# Patient Record
Sex: FEMALE | Race: OTHER | URBAN - METROPOLITAN AREA
[De-identification: names, ages, dates, MRNs, and addresses within clinical notes are randomized per-mention and may not be internally consistent; named-entity substitution may affect disease eponyms.]

---

## 2019-09-17 ENCOUNTER — OFFICE VISIT (OUTPATIENT)
Dept: FAMILY MEDICINE CLINIC | Facility: CLINIC | Age: 29
End: 2019-09-17
Payer: COMMERCIAL

## 2019-09-17 VITALS
HEART RATE: 82 BPM | TEMPERATURE: 99 F | BODY MASS INDEX: 24.54 KG/M2 | WEIGHT: 125 LBS | DIASTOLIC BLOOD PRESSURE: 60 MMHG | HEIGHT: 60 IN | RESPIRATION RATE: 16 BRPM | SYSTOLIC BLOOD PRESSURE: 102 MMHG | OXYGEN SATURATION: 98 %

## 2019-09-17 DIAGNOSIS — J02.9 PHARYNGITIS, UNSPECIFIED ETIOLOGY: Primary | ICD-10-CM

## 2019-09-17 LAB — CONTROL LINE PRESENT WITH A CLEAR BACKGROUND (YES/NO): YES YES/NO

## 2019-09-17 PROCEDURE — 87147 CULTURE TYPE IMMUNOLOGIC: CPT | Performed by: PHYSICIAN ASSISTANT

## 2019-09-17 PROCEDURE — 87880 STREP A ASSAY W/OPTIC: CPT | Performed by: PHYSICIAN ASSISTANT

## 2019-09-17 PROCEDURE — 99202 OFFICE O/P NEW SF 15 MIN: CPT | Performed by: PHYSICIAN ASSISTANT

## 2019-09-17 PROCEDURE — 87081 CULTURE SCREEN ONLY: CPT | Performed by: PHYSICIAN ASSISTANT

## 2019-09-17 RX ORDER — AMOXICILLIN 875 MG/1
875 TABLET, COATED ORAL 2 TIMES DAILY
Qty: 20 TABLET | Refills: 0 | Status: SHIPPED | OUTPATIENT
Start: 2019-09-17 | End: 2019-09-21

## 2019-09-17 RX ORDER — FEXOFENADINE HCL 180 MG/1
180 TABLET ORAL DAILY
COMMUNITY

## 2019-09-17 NOTE — PROGRESS NOTES
CHIEF COMPLAINT:   Patient presents with:  Sore Throat        HPI:   Darby Loyola is 30year old female presents to clinic with complaint of  sore throat and bilateral ear pain since yesterday.    She reports she think she has been having some allergy congestion. THROAT: Posterior pharynx erythematous and injected. small PND, no exudates. Tonsils 2/4.    NECK: supple and mobile  LUNGS: clear to auscultation without R/R/W.  CARDIO: RRR without murmur  GI: good BS's,no masses, hepatosplenomegaly, or tende

## 2019-09-17 NOTE — PATIENT INSTRUCTIONS
Self-Care for Sore Throats  Sore throats occur for many reasons, such as colds, allergies, and infections caused by viruses or bacteria. In any case, your throat becomes red and sore.  Your goal for self-care is to reduce your discomfort while giving your © 1155-2183 [address], [address] [place]. All rights reserved. This information is not intended as a substitute for professional medical care. Always follow your healthcare professional's instructions.               V · Avoid salty or spicy foods, which can be irritating to the throat. Follow-up care  Follow up with your healthcare provider or our staff if you are not improving over the next week.   When to seek medical advice  Call your healthcare provider right away i

## 2019-09-19 ENCOUNTER — TELEPHONE (OUTPATIENT)
Dept: FAMILY MEDICINE CLINIC | Facility: CLINIC | Age: 29
End: 2019-09-19

## 2019-09-19 DIAGNOSIS — J20.9 BRONCHITIS WITH BRONCHOSPASM: ICD-10-CM

## 2019-09-19 DIAGNOSIS — J02.0 STREP PHARYNGITIS: Primary | ICD-10-CM

## 2019-09-19 RX ORDER — AZITHROMYCIN 250 MG/1
TABLET, FILM COATED ORAL
Qty: 6 TABLET | Refills: 0 | Status: SHIPPED | OUTPATIENT
Start: 2019-09-19 | End: 2019-10-04 | Stop reason: ALTCHOICE

## 2019-09-19 RX ORDER — PREDNISONE 20 MG/1
TABLET ORAL
Qty: 10 TABLET | Refills: 0 | Status: SHIPPED | OUTPATIENT
Start: 2019-09-19 | End: 2019-10-04 | Stop reason: ALTCHOICE

## 2019-09-19 RX ORDER — ALBUTEROL SULFATE 90 UG/1
2 AEROSOL, METERED RESPIRATORY (INHALATION) EVERY 4 HOURS PRN
Qty: 1 INHALER | Refills: 0 | Status: SHIPPED | OUTPATIENT
Start: 2019-09-19 | End: 2019-11-07 | Stop reason: ALTCHOICE

## 2019-09-19 NOTE — TELEPHONE ENCOUNTER
Patient returned the call. Advised that her Throat Culture isolated a strain of Strep which does not usually cause sore throat and illness sx.   Pt states that she held the printed rx for Amoxicillin which was rx'd if Strep positive or if she developed ear

## 2019-09-21 ENCOUNTER — TELEPHONE (OUTPATIENT)
Dept: FAMILY MEDICINE CLINIC | Facility: CLINIC | Age: 29
End: 2019-09-21

## 2019-09-21 ENCOUNTER — OFFICE VISIT (OUTPATIENT)
Dept: FAMILY MEDICINE CLINIC | Facility: CLINIC | Age: 29
End: 2019-09-21
Payer: COMMERCIAL

## 2019-09-21 VITALS
HEIGHT: 58 IN | HEART RATE: 85 BPM | DIASTOLIC BLOOD PRESSURE: 68 MMHG | RESPIRATION RATE: 20 BRPM | OXYGEN SATURATION: 98 % | WEIGHT: 123 LBS | TEMPERATURE: 99 F | BODY MASS INDEX: 25.82 KG/M2 | SYSTOLIC BLOOD PRESSURE: 92 MMHG

## 2019-09-21 DIAGNOSIS — Z23 NEED FOR VACCINATION: ICD-10-CM

## 2019-09-21 DIAGNOSIS — Z30.42 ENCOUNTER FOR SURVEILLANCE OF INJECTABLE CONTRACEPTIVE: ICD-10-CM

## 2019-09-21 DIAGNOSIS — D17.1 LIPOMA OF SKIN AND SUBCUTANEOUS TISSUE OF TRUNK: ICD-10-CM

## 2019-09-21 DIAGNOSIS — J06.9 URI WITH COUGH AND CONGESTION: Primary | ICD-10-CM

## 2019-09-21 PROCEDURE — 90686 IIV4 VACC NO PRSV 0.5 ML IM: CPT | Performed by: FAMILY MEDICINE

## 2019-09-21 PROCEDURE — 90471 IMMUNIZATION ADMIN: CPT | Performed by: FAMILY MEDICINE

## 2019-09-21 PROCEDURE — 99203 OFFICE O/P NEW LOW 30 MIN: CPT | Performed by: FAMILY MEDICINE

## 2019-09-21 RX ORDER — MONTELUKAST SODIUM 10 MG/1
TABLET ORAL
Refills: 0 | COMMUNITY
Start: 2019-09-17 | End: 2020-05-12 | Stop reason: ALTCHOICE

## 2019-09-21 RX ORDER — LIDOCAINE HYDROCHLORIDE 20 MG/ML
SOLUTION OROPHARYNGEAL
Refills: 0 | COMMUNITY
Start: 2019-09-18 | End: 2019-09-21

## 2019-09-21 RX ORDER — MEDROXYPROGESTERONE ACETATE 150 MG/ML
150 INJECTION, SUSPENSION INTRAMUSCULAR ONCE
Status: DISCONTINUED | OUTPATIENT
Start: 2019-11-19 | End: 2020-02-03

## 2019-09-21 RX ORDER — IPRATROPIUM BROMIDE 42 UG/1
SPRAY, METERED NASAL
Refills: 0 | COMMUNITY
Start: 2019-09-17

## 2019-09-21 RX ORDER — FLUTICASONE PROPIONATE 50 MCG
SPRAY, SUSPENSION (ML) NASAL DAILY
COMMUNITY

## 2019-09-21 NOTE — TELEPHONE ENCOUNTER
Patient signed A medical records authorization form for [address] [place] [address] identified below to disclose patient health information to Victorina Pereira Dr:     [address] [place] [address]  Dorys Escobedo [facility] [address]  Jaquan Hall  Phone: [phone]   Fax: [phone]

## 2019-09-21 NOTE — PATIENT INSTRUCTIONS
Understanding a Lipoma     A lipoma is a lump under the skin that’s made of fat. It’s not cancer (benign). It feels soft like rubber when you press it, and in most cases it doesn’t hurt. Some people have more than one.  A lipoma grows slowly over time and A large lipoma inside the body can press on organs, nerves, or other tissues and cause problems. For example, it can cause problems with breathing or digestion.   Living with a lipoma  Your healthcare provider may look at the lipoma during regular checkups

## 2019-09-22 PROBLEM — J30.9 ALLERGIC RHINITIS: Status: ACTIVE | Noted: 2019-09-22

## 2019-09-22 NOTE — PROGRESS NOTES
CHIEF COMPLAINT: Patient presents with:  Establish Care: allergies  Lump: on ribcage; noticed 2 days ago        HPI:     Dayna Kirby is a 30year old female presents for allergies, cold and sinus symptoms. Lump on abdomen.     Miracle Randall is a 31 yo F here t Pressure Father    • Diabetes Paternal Grandmother    • No Known Problems Mother    • Diabetes Maternal Grandmother    • Depression Maternal Grandmother    • Diabetes Maternal Grandfather    • High Cholesterol Maternal Grandfather    • No Known Problems Si pain and diarrhea. Neurological: Positive for headaches. Pertinent positives and negatives noted in the the HPI.     PHYSICAL EXAM:   BP 92/68 (BP Location: Left arm, Patient Position: Sitting, Cuff Size: adult)   Pulse 85   Temp 98.5 °F (36.9 °C) to Gen Surg fro eval and removal.    - SURGERY - INTERNAL    3. Need for vaccination    - FLULAVAL INFLUENZA VACCINE QUAD PRESERVATIVE FREE 0.5 ML    4.  Encounter for surveillance of injectable contraceptive  Was previously receiving this at Due 11/19-12/3

## 2019-09-27 ENCOUNTER — MED REC SCAN ONLY (OUTPATIENT)
Dept: FAMILY MEDICINE CLINIC | Facility: CLINIC | Age: 29
End: 2019-09-27

## 2019-10-04 ENCOUNTER — OFFICE VISIT (OUTPATIENT)
Dept: SURGERY | Facility: CLINIC | Age: 29
End: 2019-10-04
Payer: COMMERCIAL

## 2019-10-04 VITALS — HEART RATE: 85 BPM | TEMPERATURE: 99 F | SYSTOLIC BLOOD PRESSURE: 119 MMHG | DIASTOLIC BLOOD PRESSURE: 82 MMHG

## 2019-10-04 DIAGNOSIS — D17.1 LIPOMA OF ABDOMINAL WALL: Primary | ICD-10-CM

## 2019-10-04 PROCEDURE — 99203 OFFICE O/P NEW LOW 30 MIN: CPT | Performed by: SURGERY

## 2019-10-04 NOTE — H&P
New Patient Visit Note       Active Problems      1.  Lipoma of abdominal wall        Chief Complaint   Patient presents with:  Lump: NP ref by Dr. Edmondson Other Rt side Pt noticed it about 1 month ago, getting bigger and haing more discomfort      Allergi Cardiovascular: Negative for chest pain and palpitations. Gastrointestinal: Negative for abdominal distention, abdominal pain, blood in stool, constipation, diarrhea, nausea and vomiting.    Genitourinary: Negative for difficulty urinating, dysuria and fr 20-year-old female who is here for evaluation of anterior abdominal wall subcutaneous mass. She states that this has been present for quite some time but has recently increased in size significantly.   On physical examination this appears to be consistent

## 2019-11-01 ENCOUNTER — LAB REQUISITION (OUTPATIENT)
Dept: LAB | Facility: HOSPITAL | Age: 29
End: 2019-11-01
Payer: COMMERCIAL

## 2019-11-01 DIAGNOSIS — E88.2 LIPOMATOSIS, NOT ELSEWHERE CLASSIFIED: ICD-10-CM

## 2019-11-01 PROCEDURE — 88304 TISSUE EXAM BY PATHOLOGIST: CPT | Performed by: SURGERY

## 2019-11-07 ENCOUNTER — OFFICE VISIT (OUTPATIENT)
Dept: SURGERY | Facility: CLINIC | Age: 29
End: 2019-11-07

## 2019-11-07 VITALS
WEIGHT: 123 LBS | BODY MASS INDEX: 24.15 KG/M2 | HEART RATE: 76 BPM | SYSTOLIC BLOOD PRESSURE: 102 MMHG | TEMPERATURE: 98 F | HEIGHT: 60 IN | DIASTOLIC BLOOD PRESSURE: 68 MMHG

## 2019-11-07 DIAGNOSIS — R22.2 ABDOMINAL WALL LUMP: Primary | ICD-10-CM

## 2019-11-07 PROCEDURE — 99024 POSTOP FOLLOW-UP VISIT: CPT | Performed by: PHYSICIAN ASSISTANT

## 2019-11-07 NOTE — PROGRESS NOTES
Post Operative Visit Note       Active Problems  1. Abdominal wall lump         Chief Complaint   Patient presents with:  Post-Op: Post op visit--- excision of right upper quadrant abd lipoma done 11/1/19 with Dr. Adam Casas. c/o incisional pain 6/10.  Denies an Smoker      Smokeless tobacco: Never Used    Substance and Sexual Activity      Alcohol use:  Yes        Alcohol/week: 0.0 standard drinks        Frequency: Monthly or less        Drinks per session: 1 or 2        Binge frequency: Never        Comment: Soci (Oral)   Ht 60\"   Wt 123 lb (55.8 kg)   BMI 24.02 kg/m²   Physical Exam   Constitutional: She is oriented to person, place, and time. She appears well-developed and well-nourished. No distress. HENT:   Head: Normocephalic and atraumatic.    Eyes: Conjunc plan.           No orders of the defined types were placed in this encounter. Imaging & Referrals   None    Follow Up  Return if symptoms worsen or fail to improve.     Kathy Vallejo

## 2020-02-03 ENCOUNTER — OFFICE VISIT (OUTPATIENT)
Dept: FAMILY MEDICINE CLINIC | Facility: CLINIC | Age: 30
End: 2020-02-03
Payer: COMMERCIAL

## 2020-02-03 VITALS
BODY MASS INDEX: 24.15 KG/M2 | WEIGHT: 123 LBS | TEMPERATURE: 99 F | SYSTOLIC BLOOD PRESSURE: 106 MMHG | OXYGEN SATURATION: 99 % | DIASTOLIC BLOOD PRESSURE: 68 MMHG | HEIGHT: 60 IN | RESPIRATION RATE: 18 BRPM | HEART RATE: 90 BPM

## 2020-02-03 DIAGNOSIS — Z98.890 STATUS POST EXCISION OF LIPOMA: Primary | ICD-10-CM

## 2020-02-03 DIAGNOSIS — Z86.018 STATUS POST EXCISION OF LIPOMA: Primary | ICD-10-CM

## 2020-02-03 DIAGNOSIS — Z30.42 SURVEILLANCE FOR DEPO-PROVERA CONTRACEPTION: ICD-10-CM

## 2020-02-03 LAB
CONTROL LINE PRESENT WITH A CLEAR BACKGROUND (YES/NO): YES YES/NO
KIT LOT #: NORMAL NUMERIC

## 2020-02-03 PROCEDURE — 99214 OFFICE O/P EST MOD 30 MIN: CPT | Performed by: FAMILY MEDICINE

## 2020-02-03 PROCEDURE — 81025 URINE PREGNANCY TEST: CPT | Performed by: FAMILY MEDICINE

## 2020-02-03 PROCEDURE — 96372 THER/PROPH/DIAG INJ SC/IM: CPT | Performed by: FAMILY MEDICINE

## 2020-02-03 RX ORDER — MEDROXYPROGESTERONE ACETATE 150 MG/ML
150 INJECTION, SUSPENSION INTRAMUSCULAR ONCE
Status: DISCONTINUED | OUTPATIENT
Start: 2020-05-03 | End: 2020-05-12

## 2020-02-03 RX ORDER — MEDROXYPROGESTERONE ACETATE 150 MG/ML
150 INJECTION, SUSPENSION INTRAMUSCULAR ONCE
Status: COMPLETED | OUTPATIENT
Start: 2020-02-03 | End: 2020-02-03

## 2020-02-03 RX ADMIN — MEDROXYPROGESTERONE ACETATE 150 MG: 150 INJECTION, SUSPENSION INTRAMUSCULAR at 08:59:00

## 2020-02-03 NOTE — PROGRESS NOTES
CHIEF COMPLAINT: No chief complaint on file. HPI:     Myra Weber is a 30year old female presents for Depo Provera and pos-op pain. Depo Provera: Pt had been on an IUD about 4 yrs ago and currently on Depo Provera for the last 3 years.  She m Ipratropium Bromide 0.06 % Nasal Solution INT 2 SPRAYS IEN TID  0   • Montelukast Sodium 10 MG Oral Tab TK 1 T PO  QPM  0   • Fluticasone Propionate 50 MCG/ACT Nasal Suspension by Each Nare route daily.      • Fexofenadine HCl 180 MG Oral Tab Take 180 mg by Lot # 02/03/2020 [id number]    • Kit Expiration Date 02/03/2020 06/30/2021       REVIEWED THIS VISIT  ASSESSMENT/PLAN:   30year old female with    1. Status post excision of lipoma  Reassured pt that there is no active infection and post-op pain may take u

## 2020-02-03 NOTE — PATIENT INSTRUCTIONS
As of October 6th 2014, the Drug Enforcement Agency [facility]) is reclassifying all hydrocodone combination medications from Schedule III to Schedule II. This includes medications such as Norco, Vicodin, Lortab, Zohydro, and Vicoprofen.      What this means for types of birth control. Some are more effective than others. New types are being tested all the time. Your healthcare provider can help you decide which type of birth control is best for you.  But no matter which type you choose, you and your partner must u his penis just before he ejaculates. · Fertility awareness method is when a woman keeps track of her fertile days. She only has intercourse at times when she is not likely to get pregnant.   Emergency contraception [facility])  Emergency contraception can help pr

## 2020-05-12 ENCOUNTER — TELEPHONE (OUTPATIENT)
Dept: FAMILY MEDICINE CLINIC | Facility: CLINIC | Age: 30
End: 2020-05-12

## 2020-05-12 ENCOUNTER — TELEMEDICINE (OUTPATIENT)
Dept: FAMILY MEDICINE CLINIC | Facility: CLINIC | Age: 30
End: 2020-05-12

## 2020-05-12 DIAGNOSIS — Z30.42 SURVEILLANCE FOR DEPO-PROVERA CONTRACEPTION: ICD-10-CM

## 2020-05-12 DIAGNOSIS — J30.2 SEASONAL ALLERGIES: Primary | ICD-10-CM

## 2020-05-12 PROCEDURE — 99213 OFFICE O/P EST LOW 20 MIN: CPT | Performed by: FAMILY MEDICINE

## 2020-05-12 RX ORDER — MEDROXYPROGESTERONE ACETATE 150 MG/ML
150 INJECTION, SUSPENSION INTRAMUSCULAR ONCE
Status: DISCONTINUED | OUTPATIENT
Start: 2020-05-15 | End: 2020-05-21

## 2020-05-12 RX ORDER — MEDROXYPROGESTERONE ACETATE 150 MG/ML
150 INJECTION, SUSPENSION INTRAMUSCULAR ONCE
Status: DISCONTINUED | OUTPATIENT
Start: 2020-05-12 | End: 2020-05-12

## 2020-05-12 NOTE — TELEPHONE ENCOUNTER
Pt had video visit with me today 5/12/20 re: restarting Depo Provera. Her last dose was 2/3/20 and she already missed her window of her next dose (shuld have been given 4/32-5/5).  I pended order and please call her to schedule a nurse visit to come in for

## 2020-05-12 NOTE — PATIENT INSTRUCTIONS
Controlling Allergens: In the [facility]  Even a clean home can be full of allergens. So take a moment to see what you can do to cut down on allergens in each room of your home. · Buy an air purifier with a HEPA filter.  Look online or in [employer] f · Control pests such as cockroaches and mice. Close up areas of the home where pests can enter. Don't leave open food out in the kitchen. Use bait or traps to kill pests. Get professional pest control help if the problem doesn't go away.   · Try to stay ace

## 2020-05-13 NOTE — PROGRESS NOTES
Video visit    This visit is conducted using Telemedicine with live, interactive video and audio. Naun Pickens verbally consents to a Video visit on 05/12/20.     Patient understands and accepts financial responsibility for any deductible, co-insura for Depo Provera contraception: Last dose was 2/3, missed her next dose by 1 week. Will have RN call her to schedule nurse visit to administer next dose. Return for staff will call you to schedule nurse visit appt for Depo Provera.       Celestina Romeo

## 2020-05-20 ENCOUNTER — TELEPHONE (OUTPATIENT)
Dept: FAMILY MEDICINE CLINIC | Facility: CLINIC | Age: 30
End: 2020-05-20

## 2020-05-20 DIAGNOSIS — Z30.42 SURVEILLANCE FOR DEPO-PROVERA CONTRACEPTION: Primary | ICD-10-CM

## 2020-05-20 NOTE — TELEPHONE ENCOUNTER
Pt called office stating she needed to make an appt for her BC shot, but pt just got pete from the [facility] on 05/18/2020. Pt wants to know when she can come in to get her shot.

## 2020-05-21 RX ORDER — MEDROXYPROGESTERONE ACETATE 150 MG/ML
150 INJECTION, SUSPENSION INTRAMUSCULAR ONCE
Status: COMPLETED | OUTPATIENT
Start: 2020-05-22 | End: 2020-05-22

## 2020-05-22 ENCOUNTER — NURSE ONLY (OUTPATIENT)
Dept: FAMILY MEDICINE CLINIC | Facility: CLINIC | Age: 30
End: 2020-05-22
Payer: COMMERCIAL

## 2020-05-22 PROCEDURE — 96372 THER/PROPH/DIAG INJ SC/IM: CPT | Performed by: FAMILY MEDICINE

## 2020-05-22 RX ADMIN — MEDROXYPROGESTERONE ACETATE 150 MG: 150 INJECTION, SUSPENSION INTRAMUSCULAR at 08:14:00

## 2020-08-12 ENCOUNTER — NURSE ONLY (OUTPATIENT)
Dept: FAMILY MEDICINE CLINIC | Facility: CLINIC | Age: 30
End: 2020-08-12
Payer: COMMERCIAL

## 2020-08-12 VITALS — BODY MASS INDEX: 24 KG/M2 | DIASTOLIC BLOOD PRESSURE: 66 MMHG | SYSTOLIC BLOOD PRESSURE: 98 MMHG | WEIGHT: 123.38 LBS

## 2020-08-12 DIAGNOSIS — Z30.42 SURVEILLANCE FOR DEPO-PROVERA CONTRACEPTION: Primary | ICD-10-CM

## 2020-08-12 DIAGNOSIS — Z30.42 ENCOUNTER FOR SURVEILLANCE OF INJECTABLE CONTRACEPTIVE: ICD-10-CM

## 2020-08-12 LAB — CONTROL LINE PRESENT WITH A CLEAR BACKGROUND (YES/NO): YES YES/NO

## 2020-08-12 PROCEDURE — 3074F SYST BP LT 130 MM HG: CPT | Performed by: FAMILY MEDICINE

## 2020-08-12 PROCEDURE — 3078F DIAST BP <80 MM HG: CPT | Performed by: FAMILY MEDICINE

## 2020-08-12 PROCEDURE — 81025 URINE PREGNANCY TEST: CPT | Performed by: FAMILY MEDICINE

## 2020-08-12 PROCEDURE — 96372 THER/PROPH/DIAG INJ SC/IM: CPT | Performed by: FAMILY MEDICINE

## 2020-08-12 RX ORDER — MEDROXYPROGESTERONE ACETATE 150 MG/ML
150 INJECTION, SUSPENSION INTRAMUSCULAR ONCE
Status: COMPLETED | OUTPATIENT
Start: 2020-08-12 | End: 2020-08-12

## 2020-08-12 RX ADMIN — MEDROXYPROGESTERONE ACETATE 150 MG: 150 INJECTION, SUSPENSION INTRAMUSCULAR at 14:18:00

## 2020-08-12 NOTE — PROGRESS NOTES
Patient seen today in office for Depo-provera shot. Patient consent for reviewed and signed by patient. Weight, BP and bleeding history reviewed. POC urine HCG collect prior to administration. Results negative.     Depo-provera injection administered

## 2021-09-02 ENCOUNTER — TELEMEDICINE (OUTPATIENT)
Dept: FAMILY MEDICINE CLINIC | Facility: CLINIC | Age: 31
End: 2021-09-02

## 2021-09-02 DIAGNOSIS — L73.1 INGROWN HAIR: ICD-10-CM

## 2021-09-02 DIAGNOSIS — L02.214 ABSCESS, GROIN: Primary | ICD-10-CM

## 2021-09-02 PROCEDURE — 99214 OFFICE O/P EST MOD 30 MIN: CPT | Performed by: FAMILY MEDICINE

## 2021-09-02 RX ORDER — CEPHALEXIN 500 MG/1
500 CAPSULE ORAL 3 TIMES DAILY
Qty: 21 CAPSULE | Refills: 0 | Status: SHIPPED | OUTPATIENT
Start: 2021-09-02 | End: 2021-09-09

## 2021-09-02 NOTE — PROGRESS NOTES
Video Visit    This visit is conducted using Telemedicine with live, interactive video and audio. Naun Pickens  verbally consents to a Video visit.     Patient understands and accepts financial responsibility for any deductible, co-insurance and/or co

## 2021-09-02 NOTE — PATIENT INSTRUCTIONS
Abscess (Antibiotic Treatment Only)  An abscess happens when bacteria get trapped under the skin and start to grow. Pus forms inside the abscess as the body responds to the bacteria.  An abscess can happen with an insect bite, ingrown hair, blocked oil gl Pus or fluid coming from the abscess  · Boil returns after getting better  [employer] last reviewed this educational content on 8/1/2019  © 7307-7655 [employer]. All rights reserved.  This information is not intended as a substitute for profess

## 2023-01-09 ENCOUNTER — OFFICE VISIT (OUTPATIENT)
Dept: FAMILY MEDICINE CLINIC | Facility: CLINIC | Age: 33
End: 2023-01-09
Payer: COMMERCIAL

## 2023-01-09 VITALS
SYSTOLIC BLOOD PRESSURE: 122 MMHG | RESPIRATION RATE: 18 BRPM | OXYGEN SATURATION: 98 % | HEART RATE: 80 BPM | WEIGHT: 120.9 LBS | HEIGHT: 60 IN | DIASTOLIC BLOOD PRESSURE: 86 MMHG | BODY MASS INDEX: 23.74 KG/M2 | TEMPERATURE: 98 F

## 2023-01-09 DIAGNOSIS — Z76.89 ENCOUNTER TO ESTABLISH CARE: Primary | ICD-10-CM

## 2023-01-09 DIAGNOSIS — Z23 NEED FOR VACCINATION: ICD-10-CM

## 2023-01-09 DIAGNOSIS — D17.1 LIPOMA OF TORSO: ICD-10-CM

## 2023-01-09 PROCEDURE — 90471 IMMUNIZATION ADMIN: CPT | Performed by: NURSE PRACTITIONER

## 2023-01-09 PROCEDURE — 90686 IIV4 VACC NO PRSV 0.5 ML IM: CPT | Performed by: NURSE PRACTITIONER

## 2023-01-09 PROCEDURE — 99203 OFFICE O/P NEW LOW 30 MIN: CPT | Performed by: NURSE PRACTITIONER

## 2023-01-09 NOTE — PROGRESS NOTES
Chief Complaint  Establish Care (Requesting flu shot)    Subjective          Adalgisa Ibrahim presents to [facility] to establish care. Has history of fatty lipoma that was removed in approx 2019 and she believes it is coming back since she can feel the bump under her scar. She is wishing to start a family soon and doesn't want this lipoma to bother her while pregnant. Other than this, she is healthy individual.               Cyst  This is a recurrent problem. The current episode started more than 1 year ago. The problem occurs constantly. The problem has been gradually worsening. Associated symptoms include myalgias. The symptoms are aggravated by bending. Treatments tried: surg. The treatment provided mild relief.     No outpatient medications prior to visit.     No facility-administered medications prior to visit.       Review of Systems   Musculoskeletal: Positive for myalgias.         Objective   Vital Signs:   Visit Vitals  /86   Pulse 80   Temp 98 °F (36.7 °C) (Infrared)   Resp 18   Ht 152.4 cm (60\")   Wt 54.8 kg (120 lb 14.4 oz)   SpO2 98%   BMI 23.61 kg/m²     Physical Exam  Vitals and nursing note reviewed.   Constitutional:       Appearance: She is well-developed.   HENT:      Head: Normocephalic and atraumatic.   Eyes:      General: Lids are normal.      Conjunctiva/sclera: Conjunctivae normal.   Neck:      Thyroid: No thyroid mass or thyromegaly.      Trachea: Trachea normal. No tracheal tenderness.   Cardiovascular:      Rate and Rhythm: Normal rate.      Pulses: Normal pulses.      Heart sounds: Normal heart sounds.   Pulmonary:      Effort: Pulmonary effort is normal. No respiratory distress.      Breath sounds: Normal breath sounds. No wheezing.   Abdominal:      General: There is no distension.      Palpations: Abdomen is soft. There is mass.       Musculoskeletal:         General: Normal range of motion.      Cervical back: Normal range  of motion. No edema.   Skin:     General: Skin is warm and dry.      Coloration: Skin is not pale.      Findings: No abrasion, erythema or lesion.   Neurological:      Mental Status: She is alert and oriented to person, place, and time.   Psychiatric:         Mood and Affect: Mood is not anxious. Affect is not inappropriate.         Speech: Speech normal.         Behavior: Behavior normal.         Thought Content: Thought content normal.         Judgment: Judgment normal. Judgment is not impulsive.        Result Review :                 Assessment and Plan    Diagnoses and all orders for this visit:    1. Encounter to establish care (Primary)  -     TSH; Future  -     Vitamin D,25-Hydroxy; Future  -     Comprehensive Metabolic Panel; Future  -     Hemoglobin A1c; Future  -     CBC & Differential; Future  -     Vitamin B12; Future  -     Lipid Panel; Future    2. Lipoma of torso  -     Cancel: Ambulatory Referral to General Surgery  -     TSH; Future  -     Vitamin D,25-Hydroxy; Future  -     Comprehensive Metabolic Panel; Future  -     Hemoglobin A1c; Future  -     CBC & Differential; Future  -     Vitamin B12; Future  -     Lipid Panel; Future  -     Ambulatory Referral to General Surgery    3. Need for vaccination  -     FluLaval/Fluzone >6 mos ([id number])        Previously had fatty lipoma removed in 2019    Referral to Gen surgeon placed for evaluation    Complete ordered lab work   We will call with results    Please call the office if you have any issues            Follow Up   Return if symptoms worsen or fail to improve, for Next scheduled follow up.  Patient was given instructions and counseling regarding her condition or for health maintenance advice. Please see specific information pulled into the AVS if appropriate.           This document has been electronically signed by MAGDALENA Azul on January 11, 2023 07:51 CST

## 2023-01-16 ENCOUNTER — OFFICE VISIT (OUTPATIENT)
Dept: SURGERY | Facility: CLINIC | Age: 33
End: 2023-01-16
Payer: COMMERCIAL

## 2023-01-16 VITALS
WEIGHT: 120.6 LBS | OXYGEN SATURATION: 99 % | BODY MASS INDEX: 23.68 KG/M2 | HEART RATE: 72 BPM | HEIGHT: 60 IN | DIASTOLIC BLOOD PRESSURE: 82 MMHG | SYSTOLIC BLOOD PRESSURE: 120 MMHG | TEMPERATURE: 98.4 F

## 2023-01-16 DIAGNOSIS — D17.1 LIPOMA OF TORSO: Primary | ICD-10-CM

## 2023-01-16 PROCEDURE — 99213 OFFICE O/P EST LOW 20 MIN: CPT | Performed by: NURSE PRACTITIONER

## 2023-01-16 NOTE — PROGRESS NOTES
Subjective   Adalgisa Ibrahim is a 32 y.o. female     Chief Complaint: possible lipoma of abdomen.     History of Present Illness   Ms. Ibrahim is a 32 year old patient who presents today for concerns of possible recurrent lipoma of abdomen. Reports she had a fatty tumor surgically removed from same area in 2019 at a hospital in [place].  Admits over the last several months she noticed the area enlarging and is becoming tender to touch and painful with certain movement. She denies any additional injury or trauma to the area.  She has not had any imaging performed of this mass.         Review of Systems   Constitutional: Negative.    HENT: Negative.    Respiratory: Negative.    Cardiovascular: Negative.    Gastrointestinal: Negative.    Endocrine: Negative.    Musculoskeletal: Negative.    Skin: Negative.    Allergic/Immunologic: Negative.    Neurological: Negative.    Hematological: Negative.    Psychiatric/Behavioral: Negative.      Past Medical History:   Diagnosis Date   • Lymphoma (HCC)      History reviewed. No pertinent surgical history.  Family History   Problem Relation Age of Onset   • No Known Problems Mother    • Diabetes Father    • Hypertension Father    • High cholesterol Father    • No Known Problems Sister    • No Known Problems Brother    • No Known Problems Maternal Grandmother    • No Known Problems Maternal Grandfather    • No Known Problems Paternal Grandmother    • No Known Problems Paternal Grandfather      Social History     Socioeconomic History   • Marital status: Single   Tobacco Use   • Smoking status: Never   • Smokeless tobacco: Never   Vaping Use   • Vaping Use: Never used   Substance and Sexual Activity   • Alcohol use: Never   • Drug use: Never   • Sexual activity: Defer     No Known Allergies  Vitals:    01/16/23 1120   BP: 120/82   Pulse: 72   Temp: 98.4 °F (36.9 °C)   SpO2: 99%       Home Medications:  Prior to Admission medications    Not on File       Objective   Physical  Exam  Vitals reviewed.   Constitutional:       General: She is not in acute distress.     Appearance: Normal appearance. She is not ill-appearing, toxic-appearing or diaphoretic.   Cardiovascular:      Rate and Rhythm: Normal rate.   Pulmonary:      Effort: Pulmonary effort is normal. No respiratory distress.   Abdominal:      General: A surgical scar is present. There is no distension.      Palpations: Abdomen is soft. There is mass.       Neurological:      Mental Status: She is alert.   Psychiatric:         Mood and Affect: Mood normal.         Behavior: Behavior normal.         Thought Content: Thought content normal.         Judgment: Judgment normal.         Assessment & Plan       The encounter diagnosis was Lipoma of torso.  1. Obtain imaging as ordered. Will notify her of results when available and arrange excision by surgeon as she will likely choose to have excision under sedation.                         This document has been electronically signed by MAGDALENA George on January 16, 2023 15:26 CST

## 2023-01-20 ENCOUNTER — HOSPITAL ENCOUNTER (OUTPATIENT)
Dept: ULTRASOUND IMAGING | Facility: HOSPITAL | Age: 33
Discharge: HOME | End: 2023-01-20
Admitting: NURSE PRACTITIONER
Payer: COMMERCIAL

## 2023-01-20 DIAGNOSIS — D17.1 LIPOMA OF TORSO: ICD-10-CM

## 2023-01-20 PROCEDURE — 76705 ECHO EXAM OF ABDOMEN: CPT

## 2023-02-06 ENCOUNTER — PROCEDURE VISIT (OUTPATIENT)
Dept: SURGERY | Facility: CLINIC | Age: 33
End: 2023-02-06
Payer: COMMERCIAL

## 2023-02-06 VITALS
WEIGHT: 122 LBS | BODY MASS INDEX: 23.95 KG/M2 | OXYGEN SATURATION: 99 % | HEIGHT: 60 IN | SYSTOLIC BLOOD PRESSURE: 110 MMHG | TEMPERATURE: 99.1 F | HEART RATE: 83 BPM | DIASTOLIC BLOOD PRESSURE: 80 MMHG

## 2023-02-06 DIAGNOSIS — D17.1 LIPOMA OF TORSO: Primary | ICD-10-CM

## 2023-02-06 PROCEDURE — 99213 OFFICE O/P EST LOW 20 MIN: CPT | Performed by: SURGERY

## 2023-02-06 NOTE — PROGRESS NOTES
Chief Complaint   Patient presents with   • Procedure     Ref: Kayley, lipoma removal right torso        32-year-old female who had a lipoma removed from her upper right abdomen approximately 4 years ago.  She states that over the last several months she has noticed this area swelling and occasionally uncomfortable in certain positions or with certain clothes.  She denies any skin changes or drainage from the site.      Past Medical History:   Diagnosis Date   • Lipoma of torso     right   • Lymphoma (HCC)        Past Surgical History:   Procedure Laterality Date   • LACERATION REPAIR Right     knee   • LIPOMA EXCISION Right     torso   • WISDOM TOOTH EXTRACTION      4         Current Outpatient Medications:   •  COLLAGEN PO, Take  by mouth., Disp: , Rfl:   •  ZINC OXIDE PO, Take  by mouth., Disp: , Rfl:     No Known Allergies    Immunization History   Administered Date(s) Administered   • COVID-19 (PFIZER) PURPLE CAP 05/24/2021, 06/24/2021   • Covid-19 (Pfizer) Gray Cap 03/21/2022   • FluLaval/Fluzone >6mos 01/09/2023        Family History   Problem Relation Age of Onset   • No Known Problems Mother    • Diabetes Father    • Hypertension Father    • High cholesterol Father    • No Known Problems Sister    • No Known Problems Brother    • No Known Problems Maternal Grandmother    • No Known Problems Maternal Grandfather    • No Known Problems Paternal Grandmother    • No Known Problems Paternal Grandfather    • No Known Problems Half-Brother    • Diabetes Half-Brother        Social History     Socioeconomic History   • Marital status: Single   Tobacco Use   • Smoking status: Never     Passive exposure: Never   • Smokeless tobacco: Never   Vaping Use   • Vaping Use: Never used   • Passive vaping exposure: Yes   Substance and Sexual Activity   • Alcohol use: Not Currently     Comment: none in a year   • Drug use: Never   • Sexual activity: Defer     Birth control/protection: Vasectomy       Review of Systems    Gastrointestinal: Positive for abdominal pain.   Musculoskeletal: Positive for back pain.   Skin:        Lipoma right torso   Allergic/Immunologic: Positive for environmental allergies.   Hematological: Bruises/bleeds easily.   All other systems reviewed and are negative.      Vitals:    02/06/23 1104   BP: 110/80   Pulse: 83   Temp: 99.1 °F (37.3 °C)   SpO2: 99%       Physical Exam  Constitutional:       Appearance: Normal appearance.   HENT:      Head: Normocephalic and atraumatic.   Cardiovascular:      Rate and Rhythm: Normal rate and regular rhythm.   Pulmonary:      Effort: Pulmonary effort is normal. No respiratory distress.   Abdominal:      General: There is no distension.      Palpations: Abdomen is soft.      Tenderness: There is no abdominal tenderness.      Comments: Non-discrete lesion approximately 4 x 6 cm in area underlying and slightly medial to her previous scar   Neurological:      General: No focal deficit present.      Mental Status: She is alert and oriented to person, place, and time.   Psychiatric:         Mood and Affect: Mood normal.         Behavior: Behavior normal.           ASSESSMENT    Diagnoses and all orders for this visit:    1. Lipoma of torso (Primary)  -     Case Request; Standing  -     Case Request    Other orders  -     Follow Anesthesia Guidelines / Protocol; Future  -     Provide NPO Instructions to Patient; Future  -     Chlorhexidine Skin Prep; Future  -     Follow Anesthesia Guidelines / Protocol; Standing  -     Verify NPO Status; Standing  -     Obtain Informed Consent; Standing  -     SCD (Sequential Compression Device) - Place on Patient in Pre-Op; Standing  -     Verify / Perform Chlorhexidine Skin Prep; Standing  -     Verify / Perform Chlorhexidine Skin Prep if Indicated (If Not Already Completed); Standing        PLAN    1.  Recurrence of abdominal lipoma.  I counseled the patient on OR excision given the nondiscrete nature of the lesion and previous excision.   We discussed the risk the procedure including bleeding, infection, and recurrence.  She expressed understanding and wishes to proceed with surgery.              This document has been electronically signed by David Dewey MD on February 6, 2023 11:25 CST

## 2023-03-16 ENCOUNTER — ANESTHESIA (OUTPATIENT)
Dept: PERIOP | Facility: HOSPITAL | Age: 33
End: 2023-03-16
Payer: COMMERCIAL

## 2023-03-16 ENCOUNTER — PATIENT OUTREACH (OUTPATIENT)
Dept: FAMILY MEDICINE CLINIC | Facility: CLINIC | Age: 33
End: 2023-03-16

## 2023-03-16 ENCOUNTER — HOSPITAL ENCOUNTER (OUTPATIENT)
Facility: HOSPITAL | Age: 33
Setting detail: HOSPITAL OUTPATIENT SURGERY
Discharge: HOME | End: 2023-03-16
Attending: SURGERY | Admitting: SURGERY
Payer: COMMERCIAL

## 2023-03-16 ENCOUNTER — ANESTHESIA EVENT (OUTPATIENT)
Dept: PERIOP | Facility: HOSPITAL | Age: 33
End: 2023-03-16
Payer: COMMERCIAL

## 2023-03-16 VITALS
HEIGHT: 60 IN | DIASTOLIC BLOOD PRESSURE: 64 MMHG | TEMPERATURE: 97.2 F | SYSTOLIC BLOOD PRESSURE: 102 MMHG | BODY MASS INDEX: 23.55 KG/M2 | OXYGEN SATURATION: 100 % | WEIGHT: 119.93 LBS | HEART RATE: 78 BPM | RESPIRATION RATE: 18 BRPM

## 2023-03-16 DIAGNOSIS — D17.1 LIPOMA OF TORSO: ICD-10-CM

## 2023-03-16 LAB — B-HCG UR QL: NEGATIVE

## 2023-03-16 PROCEDURE — 25010000002 MIDAZOLAM PER 1 MG: Performed by: NURSE ANESTHETIST, CERTIFIED REGISTERED

## 2023-03-16 PROCEDURE — 25010000002 PROPOFOL 10 MG/ML EMULSION: Performed by: NURSE ANESTHETIST, CERTIFIED REGISTERED

## 2023-03-16 PROCEDURE — 25010000002 FENTANYL CITRATE (PF) 100 MCG/2ML SOLUTION: Performed by: NURSE ANESTHETIST, CERTIFIED REGISTERED

## 2023-03-16 PROCEDURE — 22903 EXC ABD LES SC 3 CM/>: CPT | Performed by: SPECIALIST/TECHNOLOGIST, OTHER

## 2023-03-16 PROCEDURE — 81025 URINE PREGNANCY TEST: CPT | Performed by: ANESTHESIOLOGY

## 2023-03-16 PROCEDURE — 22903 EXC ABD LES SC 3 CM/>: CPT | Performed by: SURGERY

## 2023-03-16 PROCEDURE — 0 LIDOCAINE 1 % SOLUTION: Performed by: NURSE ANESTHETIST, CERTIFIED REGISTERED

## 2023-03-16 RX ORDER — SODIUM CHLORIDE 0.9 % (FLUSH) 0.9 %
10 SYRINGE (ML) INJECTION AS NEEDED
Status: DISCONTINUED | OUTPATIENT
Start: 2023-03-16 | End: 2023-03-16 | Stop reason: HOSPADM

## 2023-03-16 RX ORDER — HYDROCODONE BITARTRATE AND ACETAMINOPHEN 5; 325 MG/1; MG/1
1 TABLET ORAL EVERY 6 HOURS PRN
Qty: 10 TABLET | Refills: 0 | Status: SHIPPED | OUTPATIENT
Start: 2023-03-16

## 2023-03-16 RX ORDER — SODIUM CHLORIDE, SODIUM GLUCONATE, SODIUM ACETATE, POTASSIUM CHLORIDE AND MAGNESIUM CHLORIDE 526; 502; 368; 37; 30 MG/100ML; MG/100ML; MG/100ML; MG/100ML; MG/100ML
1000 INJECTION, SOLUTION INTRAVENOUS CONTINUOUS PRN
Status: DISCONTINUED | OUTPATIENT
Start: 2023-03-16 | End: 2023-03-16 | Stop reason: HOSPADM

## 2023-03-16 RX ORDER — BUPIVACAINE HYDROCHLORIDE 2.5 MG/ML
INJECTION, SOLUTION EPIDURAL; INFILTRATION; INTRACAUDAL AS NEEDED
Status: DISCONTINUED | OUTPATIENT
Start: 2023-03-16 | End: 2023-03-16 | Stop reason: HOSPADM

## 2023-03-16 RX ORDER — PROPOFOL 10 MG/ML
VIAL (ML) INTRAVENOUS AS NEEDED
Status: DISCONTINUED | OUTPATIENT
Start: 2023-03-16 | End: 2023-03-16 | Stop reason: SURG

## 2023-03-16 RX ORDER — LIDOCAINE HYDROCHLORIDE 10 MG/ML
INJECTION, SOLUTION INFILTRATION; PERINEURAL AS NEEDED
Status: DISCONTINUED | OUTPATIENT
Start: 2023-03-16 | End: 2023-03-16 | Stop reason: SURG

## 2023-03-16 RX ORDER — MIDAZOLAM HYDROCHLORIDE 1 MG/ML
INJECTION INTRAMUSCULAR; INTRAVENOUS AS NEEDED
Status: DISCONTINUED | OUTPATIENT
Start: 2023-03-16 | End: 2023-03-16 | Stop reason: SURG

## 2023-03-16 RX ORDER — FENTANYL CITRATE 50 UG/ML
INJECTION, SOLUTION INTRAMUSCULAR; INTRAVENOUS AS NEEDED
Status: DISCONTINUED | OUTPATIENT
Start: 2023-03-16 | End: 2023-03-16 | Stop reason: SURG

## 2023-03-16 RX ADMIN — PROPOFOL 20 MG: 10 INJECTION, EMULSION INTRAVENOUS at 08:59

## 2023-03-16 RX ADMIN — PROPOFOL 20 MG: 10 INJECTION, EMULSION INTRAVENOUS at 08:54

## 2023-03-16 RX ADMIN — FENTANYL CITRATE 50 MCG: 50 INJECTION, SOLUTION INTRAMUSCULAR; INTRAVENOUS at 08:25

## 2023-03-16 RX ADMIN — PROPOFOL 20 MG: 10 INJECTION, EMULSION INTRAVENOUS at 08:44

## 2023-03-16 RX ADMIN — MIDAZOLAM HYDROCHLORIDE 2 MG: 1 INJECTION, SOLUTION INTRAMUSCULAR; INTRAVENOUS at 08:17

## 2023-03-16 RX ADMIN — SODIUM CHLORIDE, SODIUM GLUCONATE, SODIUM ACETATE, POTASSIUM CHLORIDE AND MAGNESIUM CHLORIDE 1000 ML: 526; 502; 368; 37; 30 INJECTION, SOLUTION INTRAVENOUS at 07:42

## 2023-03-16 RX ADMIN — LIDOCAINE HYDROCHLORIDE 30 MG: 10 INJECTION, SOLUTION INFILTRATION; PERINEURAL at 08:27

## 2023-03-16 RX ADMIN — PROPOFOL 50 MG: 10 INJECTION, EMULSION INTRAVENOUS at 08:31

## 2023-03-16 RX ADMIN — PROPOFOL 20 MG: 10 INJECTION, EMULSION INTRAVENOUS at 08:41

## 2023-03-16 RX ADMIN — PROPOFOL 20 MG: 10 INJECTION, EMULSION INTRAVENOUS at 08:38

## 2023-03-16 RX ADMIN — MIDAZOLAM HYDROCHLORIDE 1 MG: 1 INJECTION, SOLUTION INTRAMUSCULAR; INTRAVENOUS at 08:46

## 2023-03-16 RX ADMIN — PROPOFOL 30 MG: 10 INJECTION, EMULSION INTRAVENOUS at 08:34

## 2023-03-16 RX ADMIN — PROPOFOL 20 MG: 10 INJECTION, EMULSION INTRAVENOUS at 08:49

## 2023-03-16 NOTE — OP NOTE
LIPOMA EXCISION  Procedure Report    Patient Name:  Adalgisa Ibrahim  YOB: 1990    Date of Surgery:  3/16/2023     Indications: 32-year-old female with a recurrent lipoma of her abdominal wall    Pre-op Diagnosis:   Lipoma of torso [D17.1]       Post-Op Diagnosis Codes:     * Lipoma of torso [D17.1]    Procedure/CPT® Codes:      Procedure(s):  EXCISION OF ABDOMINAL LIPOMA    Staff:  Surgeon(s):  David Dewey MD    Assistant: Cara Craft CSA    Anesthesia: Monitored Anesthesia Care    Estimated Blood Loss: 10 mL      Specimen:          Specimens     ID Source Type Tests Collected By Collected At Frozen?    A Abdomen, Right Tissue · TISSUE EXAM, [facility])   David Dewey MD 3/16/23 0838 No    Description: LIPOMA RIGHT UPPER ABDOMEN    This specimen was not marked as sent.              Findings: Subcutaneous lipoma of the right upper quadrant      Description of Procedure: After informed consent was obtained, the patient was brought to the operating room induced under IV sedation.  She was prepped draped in usual sterile fashion.  A timeout was performed.  The area was anesthetized with local anesthesia.  The scar from her previous excision was excised with an elliptical incision.  Dissection was carried down with electrocautery until the lipoma was encountered.  It was circumferentially excised.  It tracked down to the fascia, but did not penetrate.  Once the lipoma was excised, the wound was inspected for hemostasis.  Subcutaneous tissue was tacked down to the muscle with 3-0 Vicryl suture.  The skin was closed with interrupted 3-0 Vicryl, running 4-0 Monocryl, and skin glue.  The patient tolerated this procedure well without any immediate complication.          Complications: None    Assistant: Cara Craft CSA  was responsible for performing the following activities: Retraction and Suction and their skilled assistance was necessary for the success of this case.    David HELTON  MD [place]     Date: 3/16/2023  Time: 09:14 CDT

## 2023-03-16 NOTE — CONSULTS
Chief Complaint   Patient presents with   • Procedure       Ref: Kayley, lipoma removal right torso         32-year-old female who had a lipoma removed from her upper right abdomen approximately 4 years ago.  She states that over the last several months she has noticed this area swelling and occasionally uncomfortable in certain positions or with certain clothes.  She denies any skin changes or drainage from the site.        Medical History        Past Medical History:   Diagnosis Date   • Lipoma of torso       right   • Lymphoma (HCC)              Surgical History         Past Surgical History:   Procedure Laterality Date   • LACERATION REPAIR Right       knee   • LIPOMA EXCISION Right       torso   • WISDOM TOOTH EXTRACTION         4               Current Outpatient Medications:   •  COLLAGEN PO, Take  by mouth., Disp: , Rfl:   •  ZINC OXIDE PO, Take  by mouth., Disp: , Rfl:      No Known Allergies          Immunization History   Administered Date(s) Administered   • COVID-19 (PFIZER) PURPLE CAP 05/24/2021, 06/24/2021   • Covid-19 (Pfizer) Gray Cap 03/21/2022   • FluLaval/Fluzone >6mos 01/09/2023               Family History   Problem Relation Age of Onset   • No Known Problems Mother     • Diabetes Father     • Hypertension Father     • High cholesterol Father     • No Known Problems Sister     • No Known Problems Brother     • No Known Problems Maternal Grandmother     • No Known Problems Maternal Grandfather     • No Known Problems Paternal Grandmother     • No Known Problems Paternal Grandfather     • No Known Problems Half-Brother     • Diabetes Half-Brother           Social History   Social History      Socioeconomic History   • Marital status: Single   Tobacco Use   • Smoking status: Never       Passive exposure: Never   • Smokeless tobacco: Never   Vaping Use   • Vaping Use: Never used   • Passive vaping exposure: Yes   Substance and Sexual Activity   • Alcohol use: Not Currently       Comment: none in a  year   • Drug use: Never   • Sexual activity: Defer       Birth control/protection: Vasectomy            Review of Systems   Gastrointestinal: Positive for abdominal pain.   Musculoskeletal: Positive for back pain.   Skin:        Lipoma right torso   Allergic/Immunologic: Positive for environmental allergies.   Hematological: Bruises/bleeds easily.   All other systems reviewed and are negative.            Vitals:     02/06/23 1104   BP: 110/80   Pulse: 83   Temp: 99.1 °F (37.3 °C)   SpO2: 99%         Physical Exam  Constitutional:       Appearance: Normal appearance.   HENT:      Head: Normocephalic and atraumatic.   Cardiovascular:      Rate and Rhythm: Normal rate and regular rhythm.   Pulmonary:      Effort: Pulmonary effort is normal. No respiratory distress.   Abdominal:      General: There is no distension.      Palpations: Abdomen is soft.      Tenderness: There is no abdominal tenderness.      Comments: Non-discrete lesion approximately 4 x 6 cm in area underlying and slightly medial to her previous scar   Neurological:      General: No focal deficit present.      Mental Status: She is alert and oriented to person, place, and time.   Psychiatric:         Mood and Affect: Mood normal.         Behavior: Behavior normal.               ASSESSMENT     Diagnoses and all orders for this visit:     1. Lipoma of torso (Primary)  -     Case Request; Standing  -     Case Request     Other orders  -     Follow Anesthesia Guidelines / Protocol; Future  -     Provide NPO Instructions to Patient; Future  -     Chlorhexidine Skin Prep; Future  -     Follow Anesthesia Guidelines / Protocol; Standing  -     Verify NPO Status; Standing  -     Obtain Informed Consent; Standing  -     SCD (Sequential Compression Device) - Place on Patient in Pre-Op; Standing  -     Verify / Perform Chlorhexidine Skin Prep; Standing  -     Verify / Perform Chlorhexidine Skin Prep if Indicated (If Not Already Completed);  Standing           PLAN     1.  Recurrence of abdominal lipoma.  I counseled the patient on OR excision given the nondiscrete nature of the lesion and previous excision.  We discussed the risk the procedure including bleeding, infection, and recurrence.  She expressed understanding and wishes to proceed with surgery.                    This document has been electronically signed by David Dewey MD on February 6, 2023 11:25 CST      No significant changes since the patient was last seen.  Vitals:    03/16/23 0736   BP: 102/59   Pulse: 76   Resp: 18   Temp: 97 °F (36.1 °C)   SpO2: 99%       Electronically signed by David Dewey MD, 03/16/23, 7:59 AM CDT.

## 2023-03-16 NOTE — ANESTHESIA PREPROCEDURE EVALUATION
Anesthesia Evaluation     Patient summary reviewed and Nursing notes reviewed   no history of anesthetic complications:  NPO Solid Status: > 8 hours  NPO Liquid Status: > 8 hours           Airway   Mallampati: I  TM distance: <3 FB  Neck ROM: full  Anterior  Dental      Pulmonary     breath sounds clear to auscultation  (-) asthma, shortness of breath, sleep apnea, rhonchi, decreased breath sounds, wheezes, rales, not a smoker  Cardiovascular   Exercise tolerance: good (4-7 METS)    Rhythm: regular  Rate: normal    (-) hypertension, past MI, dysrhythmias, angina, DHILLON, murmur, cardiac stents, DVT      Neuro/Psych  (-) seizures, TIA, CVA  GI/Hepatic/Renal/Endo    (-)  obesity, GERD, liver disease, no renal disease, diabetes    Musculoskeletal     Abdominal   (-) obese   Substance History   (-) alcohol use, drug use     OB/GYN    (-)  Pregnant        Other                      Anesthesia Plan    ASA 2     MAC     (HCG: negative    )  intravenous induction     Anesthetic plan, risks, benefits, and alternatives have been provided, discussed and informed consent has been obtained with: patient.        CODE STATUS:

## 2023-03-16 NOTE — ANESTHESIA POSTPROCEDURE EVALUATION
Patient: Adalgisa Ibrahim    Procedure Summary     Date: 03/16/23 Room / Location: [facility] OR  / [facility] OR    Anesthesia Start: 0818 Anesthesia Stop: 0912    Procedure: EXCISION OF ABDOMINAL LIPOMA Diagnosis:       Lipoma of torso      (Lipoma of torso [D17.1])    Surgeons: David Dewey MD Provider: Mirian Garcia CRNA    Anesthesia Type: MAC ASA Status: 2          Anesthesia Type: MAC    Vitals  Vitals Value Taken Time   BP 92/50 03/16/23 0920   Temp 97.5 °F (36.4 °C) 03/16/23 0920   Pulse 79 03/16/23 0920   Resp     SpO2 99 % 03/16/23 0920           Anesthesia Post Evaluation

## 2023-03-20 LAB — REF LAB TEST METHOD: NORMAL

## 2023-03-26 ENCOUNTER — PATIENT OUTREACH (OUTPATIENT)
Dept: CASE MANAGEMENT | Age: 33
End: 2023-03-26

## 2023-03-26 NOTE — PROCEDURES
The office order for PCP removal request is Approved and finalized on March 26, 2023.     Thanks,  [facility] [employer]

## 2023-03-30 ENCOUNTER — OFFICE VISIT (OUTPATIENT)
Dept: SURGERY | Facility: CLINIC | Age: 33
End: 2023-03-30
Payer: COMMERCIAL

## 2023-03-30 VITALS
SYSTOLIC BLOOD PRESSURE: 90 MMHG | WEIGHT: 118.2 LBS | HEART RATE: 75 BPM | OXYGEN SATURATION: 96 % | HEIGHT: 60 IN | BODY MASS INDEX: 23.2 KG/M2 | DIASTOLIC BLOOD PRESSURE: 60 MMHG | TEMPERATURE: 98 F

## 2023-03-30 DIAGNOSIS — Z48.817 SURGICAL AFTERCARE, SKIN OR SUBCUTANEOUS TISSUE: Primary | ICD-10-CM

## 2023-03-30 PROCEDURE — 99024 POSTOP FOLLOW-UP VISIT: CPT | Performed by: SURGERY

## 2023-03-30 RX ORDER — PRENATAL VIT/IRON FUM/FOLIC AC 27MG-0.8MG
TABLET ORAL DAILY
COMMUNITY

## 2023-03-30 NOTE — PROGRESS NOTES
Chief Complaint   Patient presents with   • Post-op     3/16/23 excision abdominal lipoma        32-year-old female 2 weeks postop from an abdominal lipoma excision.  She has been doing well.  She has no issues with her incision, but she has noticed some swelling at the medial portion.      Past Medical History:   Diagnosis Date   • Lipoma of torso     right       Past Surgical History:   Procedure Laterality Date   • LACERATION REPAIR Right     knee   • LIPOMA EXCISION Right     torso   • WISDOM TOOTH EXTRACTION      4         Current Outpatient Medications:   •  COLLAGEN PO, Take 1 capsule by mouth Daily., Disp: , Rfl:   •  Prenatal Vit-Fe Fumarate-FA (prenatal vitamin 27-0.8) 27-0.8 MG tablet tablet, Take  by mouth Daily., Disp: , Rfl:   •  ZINC OXIDE PO, Take 1 tablet by mouth Daily., Disp: , Rfl:   •  HYDROcodone-acetaminophen (NORCO) 5-325 MG per tablet, Take 1 tablet by mouth Every 6 (Six) Hours As Needed for Moderate Pain. (Patient not taking: Reported on 3/30/2023), Disp: 10 tablet, Rfl: 0    No Known Allergies    Immunization History   Administered Date(s) Administered   • COVID-19 (PFIZER) PURPLE CAP 05/24/2021, 06/24/2021   • Covid-19 (Pfizer) Gray Cap 03/21/2022   • FluLaval/Fluzone >6mos 01/09/2023        Family History   Problem Relation Age of Onset   • No Known Problems Mother    • Diabetes Father    • Hypertension Father    • High cholesterol Father    • No Known Problems Sister    • No Known Problems Brother    • No Known Problems Maternal Grandmother    • No Known Problems Maternal Grandfather    • No Known Problems Paternal Grandmother    • No Known Problems Paternal Grandfather    • No Known Problems Half-Brother    • Diabetes Half-Brother        Social History     Socioeconomic History   • Marital status: Single   Tobacco Use   • Smoking status: Never     Passive exposure: Never   • Smokeless tobacco: Never   Vaping Use   • Vaping Use: Never used   • Passive vaping exposure: Yes   Substance  and Sexual Activity   • Alcohol use: Not Currently     Comment: none in a year   • Drug use: Yes     Types: Hydrocodone   • Sexual activity: Defer     Birth control/protection: Vasectomy           Vitals:    23 1412   BP: 90/60   Pulse: 75   Temp: 98 °F (36.7 °C)   SpO2: 96%       Physical Exam  Constitutional:       Appearance: Normal appearance.   HENT:      Head: Normocephalic and atraumatic.   Pulmonary:      Effort: Pulmonary effort is normal. No respiratory distress.   Abdominal:      General: There is no distension.      Palpations: Abdomen is soft.      Tenderness: There is no abdominal tenderness.      Comments: Well-healing right upper incision with a small seroma palpated medially   Neurological:      General: No focal deficit present.      Mental Status: She is alert and oriented to person, place, and time.   Psychiatric:         Mood and Affect: Mood normal.         Behavior: Behavior normal.         [facility]   [address] [place] [address]   Phone: [phone] or [phone]   Fax: [phone]   Sean Elizabeth M.D., Medical Director     PATIENT NAME                           LABORATORY NO.   [address]                       [id number]   [id number]                         AGE              SEX  [facility]           CLIENT REF #   [facility]           32      1990  F    [id number]   [id number]     [place]                       REQUESTING M.D.     ATTENDING M.D.     [place] [address]             DATE COLLECTED      DATE RECEIVED      DATE REPORTED   2023     DIAGNOSIS:   SOFT TISSUE, LIPOMA, UPPER ABDOMEN:   Mature adipose tissue, consistent with lipoma     CLINICAL HISTORY:   Lipoma of torso     SPECIMENS RECEIVED:   SOFT TISSUE, LIPOMA, UPPER ABDOMEN     MICROSCOPIC DESCRIPTION:   Tissue  "blocks are prepared and slides are examined microscopically on all   specimens. See diagnosis for details.     Professional interpretation rendered by Williams Traylor M.D.,LUCIO at [email or web address], [address] [place] [address].     GROSS DESCRIPTION:   Specimen received in formalin labeled \"lipoma right upper abdomen\" and   consists of 2 portions of soft yellow lobular tissue measuring 3.5 x 3.3 x 2.5 cm   in aggregate.  Sectioning reveals grossly unremarkable yellow adipose cut   surfaces.  No infarcts, areas of nodularity, or other discrete mass lesions are   grossly identified.  Representative sections are submitted in 2 cassettes.   JTM/RLL     REVIEWED, DIAGNOSED AND ELECTRONICALLY   SIGNED BY:     Williams Traylor M.D.,MAURICE.     ASSESSMENT    Diagnoses and all orders for this visit:    1. Surgical aftercare, skin or subcutaneous tissue (Primary)        PLAN    1.  Patient recovering well from her abdominal lipoma excision.  I counseled her that her seroma should continue to improve.  She can follow-up with me as needed.              This document has been electronically signed by David Dewey MD on March 30, 2023 14:38 CDT    "

## (undated) DEVICE — SUT VIC 3/0 SH 27IN J416H

## (undated) DEVICE — ADHS SKIN PREMIERPRO EXOFIN TOPICAL HI/VISC .5ML

## (undated) DEVICE — PENCL ES MEGADINE EZ/CLEAN BUTN W/HOLSTR 10FT

## (undated) DEVICE — GLV SURG SIGNATURE ESSENTIAL PF LTX SZ7.5

## (undated) DEVICE — GOWN,PREVENTION PLUS,XLNG/XXLARGE,STRL: Brand: MEDLINE

## (undated) DEVICE — GLV SURG SENSICARE PI ORTHO SZ6.5 LF STRL

## (undated) DEVICE — TRAP FLD MINIVAC MEGADYNE 100ML

## (undated) DEVICE — SUT MONOCRYL 4/0 PS2 27IN Y426H ETY426H

## (undated) DEVICE — SOL IRR NACL 0.9PCT BT 1000ML

## (undated) DEVICE — LUBE JELLY SURGILUBE TB/FLIPCAP 4.5OZ

## (undated) DEVICE — DRSNG SURESITE WNDW 4X4.5

## (undated) DEVICE — PK MAJ PROC LF 60

## (undated) DEVICE — GOWN,AURORA,NOREINF,RAGLAN,XL,STERILE: Brand: MEDLINE

## (undated) DEVICE — STERILE POLYISOPRENE POWDER-FREE SURGICAL GLOVES WITH EMOLLIENT COATING: Brand: PROTEXIS

## (undated) DEVICE — BANDAGE,GAUZE,BULKEE II,4.5"X4.1YD,STRL: Brand: MEDLINE

## (undated) NOTE — LETTER
10/04/19    Patient: Rina Subramanian  : 1990 Visit date: 10/4/2019    Dear  Dr. Jia Escobedo MD,    Thank you for referring Rina Subramanian to my practice. Please find my assessment and plan below.              History of Present Illness     29-